# Patient Record
Sex: MALE | Race: WHITE
[De-identification: names, ages, dates, MRNs, and addresses within clinical notes are randomized per-mention and may not be internally consistent; named-entity substitution may affect disease eponyms.]

---

## 2019-01-10 ENCOUNTER — HOSPITAL ENCOUNTER (OUTPATIENT)
Dept: HOSPITAL 47 - RADUSWWP | Age: 59
End: 2019-01-10
Attending: FAMILY MEDICINE
Payer: COMMERCIAL

## 2019-01-10 DIAGNOSIS — M79.672: Primary | ICD-10-CM

## 2019-01-10 NOTE — US
EXAMINATION TYPE: US venous doppler duplex LE LT

 

DATE OF EXAM: 1/10/2019 12:45 PM

 

COMPARISON: NONE

 

CLINICAL HISTORY: [id number] Pain in left foot. Edema and pain left foot

 

SIDE PERFORMED: Left  

 

TECHNIQUE:  The lower extremity deep venous system is examined utilizing real time linear array sonog
meagan with graded compression, doppler sonography and color-flow sonography.

 

VESSELS IMAGED:

External Iliac Vein (EIV)

Common Femoral Vein

Deep Femoral Vein

Greater Saphenous Vein *

Femoral Vein

Popliteal Vein

Small Saphenous Vein *

Proximal Calf Veins

(* superficial vessels)

 

 

 

Left Leg:  No evidence of DVT as visualized 

 

 Grayscale, color doppler, spectral doppler imaging performed of the deep veins of the left lower ext
remity.  There is normal flow, compressibility, vascular waveforms.

 

IMPRESSION: No ultrasound evidence for acute DVT in the left lower extremity.

## 2020-09-24 ENCOUNTER — HOSPITAL ENCOUNTER (OUTPATIENT)
Dept: HOSPITAL 47 - ORWHC2ENDO | Age: 60
Discharge: HOME | End: 2020-09-24
Attending: SURGERY
Payer: COMMERCIAL

## 2020-09-24 VITALS — HEART RATE: 60 BPM | DIASTOLIC BLOOD PRESSURE: 86 MMHG | SYSTOLIC BLOOD PRESSURE: 125 MMHG

## 2020-09-24 VITALS — TEMPERATURE: 97.8 F | RESPIRATION RATE: 16 BRPM

## 2020-09-24 VITALS — BODY MASS INDEX: 25.7 KG/M2

## 2020-09-24 DIAGNOSIS — Z12.11: Primary | ICD-10-CM

## 2020-09-24 DIAGNOSIS — Z98.890: ICD-10-CM

## 2020-09-24 DIAGNOSIS — Z87.891: ICD-10-CM

## 2020-09-24 DIAGNOSIS — K57.30: ICD-10-CM

## 2020-09-24 DIAGNOSIS — Z87.19: ICD-10-CM

## 2020-09-24 NOTE — P.OP
Date of Procedure: 09/24/20


Preoperative Diagnosis: 


Screening colonoscopy


Postoperative Diagnosis: 


Diverticulosis


Procedure(s) Performed: 


Colonoscopy


Anesthesia: MAC


Surgeon: Jimi Rollins


Pathology: none sent


Condition: stable


Disposition: PACU


Description of Procedure: 


The patient's placed on the endoscopy table in the lateral position.  He 

received IV sedation.  Digital rectal exam was performed which revealed no 

abnormalities.  The prostate was symmetrical without nodules.  The colonoscope 

was then placed patient anus and passed throughout the entire colon.  The 

ileocecal valve was visualized.  The cecum, ascending and transverse colon 

appeared normal.  In the descending and sigmoid colon there; was mild 

diverticular changes.  Scope was then brought back the rectum and this appeared 

normal.  Scope was withdrawn for patient.

## 2020-09-24 NOTE — P.GSHP
History of Present Illness


H&P Date: 09/24/20


Chief Complaint: Screening colonoscopy





Is a 59-year-old male presents today for screening colonoscopy.  Patient denies 

any significant GI complaints.





Past Medical History


Past Medical History: No Reported History


History of Any Multi-Drug Resistant Organisms: None Reported


Past Surgical History: Hernia Repair, Orthopedic Surgery


Additional Past Surgical History / Comment(s): elbow repair


Past Anesthesia/Blood Transfusion Reactions: No Reported Reaction


Smoking Status: Former smoker





- Past Family History


  ** Mother


Family Medical History: Cancer





Medications and Allergies


                                Home Medications











 Medication  Instructions  Recorded  Confirmed  Type


 


No Known Home Medications  09/22/20 09/24/20 History








                                    Allergies











Allergy/AdvReac Type Severity Reaction Status Date / Time


 


No Known Allergies Allergy   Verified 09/24/20 10:11














Surgical - Exam


                                   Vital Signs











Temp Pulse Resp Pulse Ox


 


 97.8 F   72   16   96 


 


 09/24/20 10:13  09/24/20 10:13  09/24/20 10:13  09/24/20 10:13














- General


well developed, well nourished, no distress





- Eyes


PERRL





- ENT


normal pinna





- Neck


no masses





- Respiratory


normal expansion





- Cardiovascular


Rhythm: regular





- Abdomen


Abdomen: soft, non tender





Assessment and Plan


Plan: 


We'll perform screening colonoscopy.

## 2021-06-18 ENCOUNTER — HOSPITAL ENCOUNTER (OUTPATIENT)
Dept: HOSPITAL 47 - LABWHC1 | Age: 61
Discharge: HOME | End: 2021-06-18
Attending: FAMILY MEDICINE
Payer: COMMERCIAL

## 2021-06-18 DIAGNOSIS — R19.7: ICD-10-CM

## 2021-06-18 DIAGNOSIS — R10.9: ICD-10-CM

## 2021-06-18 DIAGNOSIS — K57.90: Primary | ICD-10-CM

## 2021-06-18 LAB
ALBUMIN SERPL-MCNC: 4.5 G/DL (ref 3.8–4.9)
ALBUMIN/GLOB SERPL: 1.8 G/DL (ref 1.6–3.17)
ALP SERPL-CCNC: 74 U/L (ref 41–126)
ALT SERPL-CCNC: 38 U/L (ref 10–49)
AMYLASE SERPL-CCNC: 96 U/L (ref 23–121)
ANION GAP SERPL CALC-SCNC: 7.7 MMOL/L (ref 4–12)
AST SERPL-CCNC: 30 U/L (ref 14–35)
BUN SERPL-SCNC: 19 MG/DL (ref 9–27)
BUN/CREAT SERPL: 19 RATIO (ref 12–20)
CALCIUM SPEC-MCNC: 8.5 MG/DL (ref 8.7–10.3)
CHLORIDE SERPL-SCNC: 108 MMOL/L (ref 96–109)
CHOLEST SERPL-MCNC: 186 MG/DL (ref 0–200)
CO2 SERPL-SCNC: 26.3 MMOL/L (ref 21.6–31.8)
ERYTHROCYTE [DISTWIDTH] IN BLOOD BY AUTOMATED COUNT: 4.28 X 10*6/UL (ref 4.4–5.6)
ERYTHROCYTE [DISTWIDTH] IN BLOOD: 13.1 % (ref 11.5–14.5)
GLOBULIN SER CALC-MCNC: 2.5 G/DL (ref 1.6–3.3)
GLUCOSE SERPL-MCNC: 92 MG/DL (ref 70–110)
HBA1C MFR BLD: 5.2 % (ref 4–6)
HCT VFR BLD AUTO: 40.4 % (ref 39.6–50)
HDLC SERPL-MCNC: 33 MG/DL (ref 40–60)
HGB BLD-MCNC: 13.6 G/DL (ref 13–17)
LDLC SERPL CALC-MCNC: 135.2 MG/DL (ref 0–131)
LIPASE SERPL-CCNC: 27 U/L (ref 14–60)
MCH RBC QN AUTO: 31.8 PG (ref 27–32)
MCHC RBC AUTO-ENTMCNC: 33.7 G/DL (ref 32–37)
MCV RBC AUTO: 94.4 FL (ref 80–97)
PLATELET # BLD AUTO: 206 X 10*3/UL (ref 140–440)
POTASSIUM SERPL-SCNC: 4.3 MMOL/L (ref 3.5–5.5)
PROT SERPL-MCNC: 7 G/DL (ref 6.2–8.2)
SODIUM SERPL-SCNC: 142 MMOL/L (ref 135–145)
TRIGL SERPL-MCNC: 89 MG/DL (ref 0–149)
VLDLC SERPL CALC-MCNC: 17.8 MG/DL (ref 5–40)
WBC # BLD AUTO: 5.44 X 10*3/UL (ref 4.5–10)

## 2021-06-18 PROCEDURE — 85027 COMPLETE CBC AUTOMATED: CPT

## 2021-06-18 PROCEDURE — 83690 ASSAY OF LIPASE: CPT

## 2021-06-18 PROCEDURE — 80061 LIPID PANEL: CPT

## 2021-06-18 PROCEDURE — 36415 COLL VENOUS BLD VENIPUNCTURE: CPT

## 2021-06-18 PROCEDURE — 82150 ASSAY OF AMYLASE: CPT

## 2021-06-18 PROCEDURE — 84165 PROTEIN E-PHORESIS SERUM: CPT

## 2021-06-18 PROCEDURE — 83036 HEMOGLOBIN GLYCOSYLATED A1C: CPT

## 2021-06-18 PROCEDURE — 84443 ASSAY THYROID STIM HORMONE: CPT

## 2021-06-18 PROCEDURE — 80053 COMPREHEN METABOLIC PANEL: CPT

## 2021-06-21 LAB
ALBUMIN SERPL ELPH-MCNC: 4.44 G/DL (ref 3.8–4.9)
GAMMA GLOB SERPL ELPH-MCNC: 0.85 G/DL (ref 0.7–1.5)

## 2021-06-30 ENCOUNTER — HOSPITAL ENCOUNTER (OUTPATIENT)
Dept: HOSPITAL 47 - RADCTMAIN | Age: 61
Discharge: HOME | End: 2021-06-30
Attending: FAMILY MEDICINE
Payer: COMMERCIAL

## 2021-06-30 DIAGNOSIS — K80.20: ICD-10-CM

## 2021-06-30 DIAGNOSIS — D73.89: ICD-10-CM

## 2021-06-30 DIAGNOSIS — K57.90: Primary | ICD-10-CM

## 2021-06-30 PROCEDURE — 74177 CT ABD & PELVIS W/CONTRAST: CPT

## 2021-06-30 NOTE — CT
EXAMINATION TYPE: CT abdomen pelvis w con

 

DATE OF EXAM: 6/30/2021

 

COMPARISON: 3/7/2014

 

HISTORY: 60-year-old male with abdominal and back pain

 

CT DLP: 881.40 mGycm

Automated exposure control for dose reduction was used.

 

TECHNIQUE:  Helical acquisition of images was performed from the lung bases through the pelvis.

 

CONTRAST: 

Performed with Oral Contrast and with IV Contrast, patient injected with 100 mL of Isovue 300.

 

FINDINGS: 

 

LUNG BASES: No significant abnormality is appreciated.

 

LIVER/GB: Subcentimeter hypoattenuating lesion the right hepatic lobe posteriorly too small to visual
ize likely on the basis of cyst. Otherwise the liver is unremarkable. Hypoattenuating lesions in the 
gallbladder lumen representing cholelithiasis. No biliary ductal dilatation.

 

PANCREAS: No significant abnormality is seen.

 

SPLEEN: Multiple calcific foci within the splenic parenchyma likely related to remote granulomatous i
nfection

 

ADRENALS: No significant abnormality is seen.

 

KIDNEYS: There is a 1.5 x 1.4 x 1.3 cm hypoattenuating nonenhancing oval-shaped fluid density lesion 
in the pole of the left kidney likely representing a cyst. There are tiny 1 to 2 mm right renal colle
cting system hyperdense foci. There is no hydronephrosis. The bilateral renal parenchyma demonstrate 
normal enhancement pattern.

 

FREE AIR:  No free air is visualized.

 

RETROPERITONEAL ADENOPATHY:  None visualized

 

REPRODUCTIVE ORGANS: Scattered calcifications in the bilateral inguinal canal likely vascular.

 

URINARY BLADDER:  No significant abnormality is seen.

 

PELVIC ADENOPATHY:  None visualized.

 

OSSEOUS STRUCTURES:  No significant abnormality is seen.

 

BOWEL:  There is a small gastroesophageal hiatal hernia. There is no obstruction. Descending and sigm
oid diverticulosis evident with no significant surrounding fat stranding fluid or abscess. The append
ix appears normal.

 

Aorta is nonaneurysmal. Atherosclerotic calcifications are seen in the wall of the aorta and involve 
the aortic bifurcation.

 

Mild-to-moderate narrowing seen at L4-5 and L5-S1 with facet joint arthropathy changes at the same le
jada.

 

No significant soft tissue abnormality.

 

IMPRESSION: 

 

1. NO EVIDENCE OF ACUTE ABDOMINAL OR PELVIC ABNORMALITY.

2. DIVERTICULOSIS WITH NO DEFINITE EVIDENCE OF ACUTE DIVERTICULITIS.

3. RIGHT RENAL 1-2 MM NONOBSTRUCTIVE CALCULI VERSUS VASCULAR CALCIFICATIONS.

4. CHOLELITHIASIS WITHOUT CT EVIDENCE OF ACUTE CHOLECYSTITIS.

5. INTERVAL DEVELOPMENT OF SPLENIC CALCIFICATIONS LIKELY SEQUELA OF GRANULOMATOUS INFECTION.